# Patient Record
Sex: MALE | Race: WHITE | NOT HISPANIC OR LATINO | URBAN - METROPOLITAN AREA
[De-identification: names, ages, dates, MRNs, and addresses within clinical notes are randomized per-mention and may not be internally consistent; named-entity substitution may affect disease eponyms.]

---

## 2019-12-15 ENCOUNTER — HOSPITAL ENCOUNTER (EMERGENCY)
Facility: HOSPITAL | Age: 24
Discharge: HOME | End: 2019-12-15
Attending: EMERGENCY MEDICINE | Admitting: EMERGENCY MEDICINE

## 2019-12-15 VITALS
RESPIRATION RATE: 18 BRPM | WEIGHT: 210 LBS | HEIGHT: 77 IN | SYSTOLIC BLOOD PRESSURE: 128 MMHG | HEART RATE: 89 BPM | BODY MASS INDEX: 24.79 KG/M2 | DIASTOLIC BLOOD PRESSURE: 78 MMHG | TEMPERATURE: 97.7 F | OXYGEN SATURATION: 100 %

## 2019-12-15 DIAGNOSIS — K08.89 PAIN, DENTAL: Primary | ICD-10-CM

## 2019-12-15 DIAGNOSIS — K02.9 DENTAL CARIES: ICD-10-CM

## 2019-12-15 PROCEDURE — 99283 EMERGENCY DEPT VISIT LOW MDM: CPT

## 2019-12-15 RX ORDER — IBUPROFEN 400 MG/1
800 TABLET ORAL ONCE
Status: COMPLETED | OUTPATIENT
Start: 2019-12-15 | End: 2019-12-15

## 2019-12-15 RX ORDER — PENICILLIN V POTASSIUM 500 MG/1
500 TABLET ORAL 4 TIMES DAILY
Qty: 40 TABLET | Refills: 0 | OUTPATIENT
Start: 2019-12-15 | End: 2020-12-06

## 2019-12-15 RX ORDER — IBUPROFEN 800 MG/1
800 TABLET ORAL
Qty: 15 TABLET | Refills: 0 | OUTPATIENT
Start: 2019-12-15 | End: 2020-12-06

## 2019-12-15 RX ORDER — LIDOCAINE HYDROCHLORIDE 20 MG/ML
10 SOLUTION OROPHARYNGEAL
Status: DISCONTINUED | OUTPATIENT
Start: 2019-12-15 | End: 2019-12-15 | Stop reason: HOSPADM

## 2019-12-15 RX ORDER — PENICILLIN V POTASSIUM 250 MG/1
500 TABLET ORAL ONCE
Status: COMPLETED | OUTPATIENT
Start: 2019-12-15 | End: 2019-12-15

## 2019-12-15 RX ADMIN — IBUPROFEN 800 MG: 400 TABLET ORAL at 20:06

## 2019-12-15 RX ADMIN — LIDOCAINE HYDROCHLORIDE 10 ML: 20 SOLUTION ORAL; TOPICAL at 20:05

## 2019-12-15 RX ADMIN — PENICILLIN V POTASIUM 500 MG: 250 TABLET ORAL at 20:06

## 2019-12-16 NOTE — ED PROVIDER NOTES
Subjective   Gaston Saldaña is a 24 yr old white male that presents emergency department with complaints of dental pain.  Patient has had pain off and on throughout his mouth for years.  Patient has multiple dental caries throughout his mouth.  Patient today complains of increasing pain and swelling to his right lower gumline.  Patient has multiple broken teeth in this area.  Patient denies any drainage.  He denies any fever, chills.  He denies nausea, vomiting.  Patient has an appointment to see a dentist February 14.  He explains that this is when [facility] can get him in using his dental insurance.      History provided by:  Patient  Dental Pain   Toothache location: Right lower.  Quality:  Throbbing  Severity:  Moderate  Timing:  Constant  Progression:  Worsening  Context: dental caries    Relieved by:  Nothing  Worsened by:  Cold food/drink, hot food/drink, touching, jaw movement and pressure  Associated symptoms: gum swelling    Associated symptoms: no difficulty swallowing, no drooling and no fever        Review of Systems   Constitutional: Negative for fever.   HENT: Positive for dental problem. Negative for drooling.    Gastrointestinal: Negative for nausea and vomiting.   All other systems reviewed and are negative.      No past medical history on file.    Allergies   Allergen Reactions   • Tylenol [Acetaminophen] GI Bleeding       No past surgical history on file.    No family history on file.    Social History     Socioeconomic History   • Marital status: Single     Spouse name: Not on file   • Number of children: Not on file   • Years of education: Not on file   • Highest education level: Not on file           Objective   Physical Exam   Constitutional: He is oriented to person, place, and time. He appears well-developed and well-nourished. No distress.   HENT:   Head: Normocephalic and atraumatic.   Mouth/Throat: No trismus in the jaw.       Eyes: Conjunctivae and EOM are normal.   Neck: Normal range of motion.  "  Cardiovascular: Normal rate.   Pulmonary/Chest: Effort normal and breath sounds normal. No respiratory distress.   Musculoskeletal: Normal range of motion.   Neurological: He is alert and oriented to person, place, and time.   Skin: Skin is warm and dry.   Nursing note and vitals reviewed.      Procedures           ED Course  ED Course as of Dec 15 1935   Sun Dec 15, 2019   1934 The patient will be discharged home.  Patient encouraged to keep his appointment or move up his appointment to see the dentist.  Patient to take meds as ordered.  Patient to alternate ibuprofen and Tylenol for pain and inflammation.    [KG]      ED Course User Index  [KG] Xenia Zavala APRN                 No results found for this or any previous visit (from the past 24 hour(s)).  Note: In addition to lab results from this visit, the labs listed above may include labs taken at another facility or during a different encounter within the last 24 hours. Please correlate lab times with ED admission and discharge times for further clarification of the services performed during this visit.    No orders to display     Vitals:    12/15/19 1856   BP: 128/78   BP Location: Left arm   Patient Position: Sitting   Pulse: 89   Resp: 18   Temp: 97.7 °F (36.5 °C)   TempSrc: Oral   SpO2: 100%   Weight: 95.3 kg (210 lb)   Height: 195.6 cm (77\")     Medications   penicillin v potassium (VEETID) tablet 500 mg (has no administration in time range)   ibuprofen (ADVIL,MOTRIN) tablet 800 mg (has no administration in time range)   Lidocaine Viscous HCl (XYLOCAINE) 2 % mouth solution 10 mL (has no administration in time range)     ECG/EMG Results (last 24 hours)     ** No results found for the last 24 hours. **        No orders to display               No data recorded                        MDM    Final diagnoses:   Pain, dental   Dental caries              Xenia Zavala APRN  12/15/19 1936    "

## 2020-12-05 ENCOUNTER — HOSPITAL ENCOUNTER (EMERGENCY)
Facility: HOSPITAL | Age: 25
Discharge: HOME | End: 2020-12-06
Attending: EMERGENCY MEDICINE | Admitting: EMERGENCY MEDICINE

## 2020-12-05 ENCOUNTER — APPOINTMENT (OUTPATIENT)
Dept: GENERAL RADIOLOGY | Facility: HOSPITAL | Age: 25
End: 2020-12-05

## 2020-12-05 DIAGNOSIS — S61.319A LACERATION OF NAIL BED OF FINGER, INITIAL ENCOUNTER: ICD-10-CM

## 2020-12-05 DIAGNOSIS — S61.317A LACERATION OF LEFT LITTLE FINGER WITHOUT FOREIGN BODY WITH DAMAGE TO NAIL, INITIAL ENCOUNTER: Primary | ICD-10-CM

## 2020-12-05 DIAGNOSIS — S61.309A NAIL AVULSION, FINGER, INITIAL ENCOUNTER: ICD-10-CM

## 2020-12-05 PROCEDURE — 99283 EMERGENCY DEPT VISIT LOW MDM: CPT

## 2020-12-05 PROCEDURE — 73140 X-RAY EXAM OF FINGER(S): CPT

## 2020-12-05 RX ORDER — LIDOCAINE HYDROCHLORIDE 10 MG/ML
10 INJECTION, SOLUTION EPIDURAL; INFILTRATION; INTRACAUDAL; PERINEURAL ONCE
Status: COMPLETED | OUTPATIENT
Start: 2020-12-05 | End: 2020-12-06

## 2020-12-06 VITALS
HEIGHT: 77 IN | DIASTOLIC BLOOD PRESSURE: 95 MMHG | SYSTOLIC BLOOD PRESSURE: 145 MMHG | RESPIRATION RATE: 18 BRPM | HEART RATE: 98 BPM | WEIGHT: 210 LBS | BODY MASS INDEX: 24.79 KG/M2 | TEMPERATURE: 96.9 F | OXYGEN SATURATION: 97 %

## 2020-12-06 PROCEDURE — 63710000001 ONDANSETRON ODT 4 MG TABLET DISPERSIBLE: Performed by: EMERGENCY MEDICINE

## 2020-12-06 RX ORDER — ONDANSETRON 4 MG/1
4 TABLET, ORALLY DISINTEGRATING ORAL ONCE
Status: COMPLETED | OUTPATIENT
Start: 2020-12-06 | End: 2020-12-06

## 2020-12-06 RX ORDER — TRAMADOL HYDROCHLORIDE 50 MG/1
50 TABLET ORAL EVERY 6 HOURS PRN
Qty: 12 TABLET | Refills: 0 | Status: SHIPPED | OUTPATIENT
Start: 2020-12-06

## 2020-12-06 RX ORDER — TRAMADOL HYDROCHLORIDE 50 MG/1
50 TABLET ORAL ONCE
Status: COMPLETED | OUTPATIENT
Start: 2020-12-06 | End: 2020-12-06

## 2020-12-06 RX ORDER — CEPHALEXIN 500 MG/1
500 CAPSULE ORAL 2 TIMES DAILY
Qty: 14 CAPSULE | Refills: 0 | Status: SHIPPED | OUTPATIENT
Start: 2020-12-06

## 2020-12-06 RX ORDER — ONDANSETRON 4 MG/1
4 TABLET, ORALLY DISINTEGRATING ORAL EVERY 4 HOURS
Qty: 12 TABLET | Refills: 0 | Status: SHIPPED | OUTPATIENT
Start: 2020-12-06

## 2020-12-06 RX ADMIN — TRAMADOL HYDROCHLORIDE 50 MG: 50 TABLET, FILM COATED ORAL at 01:13

## 2020-12-06 RX ADMIN — ONDANSETRON 4 MG: 4 TABLET, ORALLY DISINTEGRATING ORAL at 01:13

## 2020-12-06 RX ADMIN — LIDOCAINE HYDROCHLORIDE 10 ML: 10 INJECTION, SOLUTION EPIDURAL; INFILTRATION; INTRACAUDAL; PERINEURAL at 00:41
